# Patient Record
(demographics unavailable — no encounter records)

---

## 2024-10-29 NOTE — HISTORY OF PRESENT ILLNESS
[FreeTextEntry1] : 51yoF who presents with chief complaint of painless rectal bleeding that occurs once a month or so for the last few years.  She has a family history of CRC in her mother and has undergone three colonoscopies since her 20s, most recently ~2 years ago (Dr. Rodney, hemorrhoids, diverticulosis noted, no specimens taken).  No fever, chills, drainage other than blood.

## 2024-10-29 NOTE — PROCEDURE
[FreeTextEntry1] : After informed consent was obtained, a lubricated lighted anoscope was inserted into the anal canal to evaluate hemorrhoids. The canal was inspected circumferentially up to the level above the dentate line.  The L lateral internal hemorrhoid was banded above the dentate line.  The scope was withdrawn. The patient tolerated the procedure well.

## 2024-10-29 NOTE — PLAN
[TextEntry] : -- Discussed with patient that she should begin a powder fiber supplement (Metamucil, Benefiber, psyllium, etc).  She was also advised to drink at least 8 glasses of water daily in addition. -- Miralax and stool softener may be added as needed -- Post rubber band ligation instructions given -- Suppository - start on Thursday -- Follow up in 3-4 weeks for repeat evaluation, possible repeat banding

## 2024-11-05 NOTE — HISTORY OF PRESENT ILLNESS
[FreeTextEntry1] : Patient is a 51-year-old female who presents for consultation.  Patient has complaints of generalized bodyaches and joint pain and low back pain.  Patient also is starting to have irregular periods.  Discussed perimenopause with patient.  Patient also states that she had bilateral breast implants and is unable to get breast MRI due to anxiety.  Discussed this with patient advised consult with breast surgeon.  Patient's last mammogram was July 2024.  Also advised patient to have rheumatology consultation.  Patient is also concerned that she has a gynecologic issue causing the low back pain advised patient have a pelvic sonogram.  Patient with a family history of a mother with colon cancer and a maternal grandmother with breast cancer.  Patient advised to have genetic testing.  Risk benefits and alternatives reviewed and questions answered patient is agreeable.  30 minutes of face time

## 2024-11-05 NOTE — DISCUSSION/SUMMARY
[FreeTextEntry1] : Impression: Perimenopause, body aches and joint pains, low back pain, family history of breast cancer and colon cancer  Recommendations and discussion as noted above dictated consultation

## 2024-11-06 NOTE — HISTORY OF PRESENT ILLNESS
[FreeTextEntry1] : 51-year-old female history of GERD, lumbar disc arthritis and herniation, anxiety ,  DeQuervain's , MELVIN    Patient reports referred by gynecologist for joint pain She reports joint pain in neck, lower back, shoulders, knees, upper arms, hips, feet, hands, wrist  Feels numbness in hands- thinks its due to carpel tunnel Joint pain going on  for several months, worse over last one month No difficultly of ROM Feels subjective swelling in hands and ankles. Chronic fatigue, not compliant with sleep apnea machine.  Colonoscopy 2 years ago, has rectal bleeding - reports she has hemorrhoids  Undergoing perimenopause  Myalgias  Numbness/tingling,  in right hand (first three fingers)  Patient denies joint erythema/warmth, morning stiffness, , fever, chills, weight loss, nasopharyngeal ulcers, chest pain, abdominal pain, cough, SOB, nausea, vomiting, diarrhea, constipation, dysuria, hematuria, rash, photosensitivity, Raynaud's, alopecia, dry eyes, dry mouth,  headaches, eye pain/redness, vision changes, myalgias, muscle weakness, dysphagia, jaw claudication miscarriages, Hx of DVT/PEs.  PMHx: As above PSHx: breast augmentation, 2 C sections, umbilical hernia, Dequervaines left wrist release, 5th metatarsal surgery for fracture  Family Hx: Denies family history of rheumatologic conditions including RA, SLE, Sjogren's, Myositis, scleroderma, or vasculitis Social Hx:  Smoking Hx: denies EtOH Hx: denies Drug use: denies Occupation:   , two girls

## 2024-11-06 NOTE — PHYSICAL EXAM
[TextEntry] :   GENERAL: Appears in no acute distress HEENT: EOMI. No conjunctival erythema. Moist mucous membranes. No nasopharyngeal ulcers NECK: Supple, no cervical lymphadenopathy CARDIOVASCULAR: RRR. S1, S2 auscultated.  PULMONARY: Clear to auscultation b/l, no wheezes, rales, or crackles MSK: No active synovitis, swelling, erythema, or warmth. Some DIP joint fullness No joint tenderness to palpation. No Bouchards or Heberdens nodes. No deformities. Normal ROM of neck, back, b/l upper and lower extremities. SKIN: No lesions or rashes NEURO: No focal deficits. Motor strength 5/5 in major muscle groups of b/l UE and LE. Sensation to soft touch intact in major dermatomes of b/l UE and LE. PSYCH: AAOx3. Normal affect and thought process.

## 2024-11-06 NOTE — ASSESSMENT
[FreeTextEntry1] : 51-year-old female history of GERD, lumbar disc arthritis and herniation, anxiety ,  DeQuervain's , MELVIN    Patient reports referred by gynecologist for joint pain She reports joint pain in neck, lower back, shoulders, knees, upper arms, hips, feet, hands, wrist  Feels numbness in hands- thinks its due to carpel tunnel Joint pain going on  for several months, worse over last one month No difficultly of ROM Feels subjective swelling in hands and ankles. Chronic fatigue, not compliant with sleep apnea machine.  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

## 2024-11-19 NOTE — PLAN
[TextEntry] : -- Continue bowel regimen -- Advised to call and schedule evaluation if any further episodes of bleeding -- Follow up as needed

## 2024-11-19 NOTE — PHYSICAL EXAM
[JVD] : no jugular venous distention  [Respiratory Effort] : normal respiratory effort [Normal Rate and Rhythm] : normal rate and rhythm [No Edema] : No edema [No Rash or Lesion] : No rash or lesion [Alert] : alert [Oriented to Person] : oriented to person [Oriented to Place] : oriented to place [Oriented to Time] : oriented to time [Calm] : calm [de-identified] : Soft, nondistended [de-identified] : External exam - L anterior and R anterior small skin tags.  LETI without masses/tenderness.  Anoscopy with circumferential grade I internal hemorrhoids, non-inflamed and nonbleeding.   [de-identified] : NAD [de-identified] : Normocephalic [de-identified] : Moves all extremities

## 2024-11-19 NOTE — HISTORY OF PRESENT ILLNESS
[FreeTextEntry1] : 11/19/2024: Presents for follow-up.  Denies any further episodes of painless bleeding other than one episode 4-5 days post-banding (expected).  None since.  She ultimately did not receive the suppositories due to insurance coverage issues.  10/29/2024: 51yoF who presents with chief complaint of painless rectal bleeding that occurs once a month or so for the last few years.  She has a family history of CRC in her mother and has undergone three colonoscopies since her 20s, most recently ~2 years ago (Dr. Rodney, hemorrhoids, diverticulosis noted, no specimens taken).  No fever, chills, drainage other than blood.

## 2025-02-06 NOTE — ASSESSMENT
[FreeTextEntry1] : Ms. Gomez is a 52 year old woman with a family history of breast cancer. Her breast exam today is without suspicious findings. The imaging is reviewed with her. We reviewed that her risk assessment is based on many factors. While her breast density last year was heterogenously dense leading to a lifetime risk of breast cancer being just over 20% at 21%, it was scattered fibroglandular density the past 3 consecutive years which would result in a lifetime risk for breast cancer less than 20%.   With a lifetime risk over 20%, management options would range from breast MRI to risk-reducing medication to, for those at very high risk from a genetic mutation or strong family history, prophylactic mastectomies. Ms. Gomez is very claustrophobic, even with xanax; we reviewed the use of contrast enhanced mammography as an alternative to breast MRI, and she would be amenable to that.   A mammogram and US are ordered for July. If the breast density is of scattered fibroglandular density, no further imaging is needed. If the breast density is heterogenously dense, heightened screening can be performed via a contrast enhanced mammogram.   She understands and is comfortable with the plan. She is encouraged to call if any questions or concerns arise.

## 2025-02-06 NOTE — HISTORY OF PRESENT ILLNESS
[FreeTextEntry1] : Ms. Gomez is a 52 year old woman who presents for a consultation for a family history of breast cancer. She is asymptomatic. No palpable breast or axillary lumps, nipple discharge, or breast skin changes.   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 Her family history is significant for breast cancer in her maternal grandmother 80's.

## 2025-02-06 NOTE — CONSULT LETTER
[Dear  ___] : Dear  [unfilled], [Consult Letter:] : I had the pleasure of evaluating your patient, [unfilled]. [Please see my note below.] : Please see my note below. [Consult Closing:] : Thank you very much for allowing me to participate in the care of this patient.  If you have any questions, please do not hesitate to contact me. [Sincerely,] : Sincerely, [FreeTextEntry3] : Emilie Shin MD FACS

## 2025-02-06 NOTE — DATA REVIEWED
[FreeTextEntry1] : I have independently reviewed the reports and the images.   B/l mammogram and US 7/29/24 - heterogenously dense [for this year; was scattered fibroglandular density in 2021, 2022, 2023] - BIRADS 2

## 2025-04-07 NOTE — HISTORY OF PRESENT ILLNESS
[FreeTextEntry1] : Patient is a 52-year-old female who presents for a routine annual gynecologic evaluation.  Patient states that she recently took antibiotics and has having some vaginal irritation and itching.  Will prescribe Lotrisone cream and Terazol 3 suppositories.  Patient with significant family history of breast and colon cancer.  Patient has a APC gene variant which puts the patient at increased risk of colon cancer.  Patient states she has had a colonoscopy about 3 years ago.  Patient also had treatment by colorectal surgeon for hemorrhoids.  Patient states she does have periodic rectal bleeding.  Discussed this with patient advised patient have consultation with gastroenterologist at her earliest convenience in view of her history and complaints.  Patient has a history of bilateral breast implants and patient is followed by breast surgeon.  Patient's last mammogram was July 2024 and last bone density was July 2023.

## 2025-04-07 NOTE — DISCUSSION/SUMMARY
[FreeTextEntry1] : Impression: Vulvovaginal itching, bilateral breast implants, APC gene mutation, family history of breast and colon cancer, otherwise normal GYN exam  Rx: Lotrisone cream and Terazol 3 suppositories-use as directed  Recommendations: Self breast exam, mammography and breast sonogram annually, continue follow-up with breast surgeon, gastroenterology consultation, calcium and vitamin D supplementation regular exercise  Follow-up in 6 months due to family history

## 2025-04-07 NOTE — PHYSICAL EXAM
[Appropriately responsive] : appropriately responsive [Alert] : alert [No Acute Distress] : no acute distress [No Lymphadenopathy] : no lymphadenopathy [Regular Rate Rhythm] : regular rate rhythm [No Murmurs] : no murmurs [Clear to Auscultation B/L] : clear to auscultation bilaterally [Soft] : soft [Non-tender] : non-tender [Non-distended] : non-distended [No HSM] : No HSM [No Lesions] : no lesions [No Mass] : no mass [Oriented x3] : oriented x3 [FreeTextEntry6] : Bilateral breast implants, no masses palpable there are no skin changes or nipple discharge axilla negative there is no lymphadenopathy [Examination Of The Breasts] : a normal appearance [] : implants [No Masses] : no breast masses were palpable [Labia Majora] : normal [Labia Minora] : normal [Normal] : normal [Uterine Adnexae] : normal [No Tenderness] : no tenderness [Nl Sphincter Tone] : normal sphincter tone [FreeTextEntry4] : Culture done [FreeTextEntry5] : Pap done [FreeTextEntry9] : Hemoccult negative

## 2025-07-03 NOTE — REASON FOR VISIT
[Consultation] : a consultation visit [FreeTextEntry1] : history of rectal bleeding and family history of colon cancer

## 2025-07-03 NOTE — ASSESSMENT
[FreeTextEntry1] : 51 yo female with history of rectal bleeding.  This most likely represents hemorrhoidal bleeding, however given genetic testing, would proceed with colonoscopy and upper endoscopy.  Local care for hemorrhoids. Risks, benefits, and alternatives to procedure explained to patient.  Patient is willing to proceed.

## 2025-07-22 NOTE — REASON FOR VISIT
[Home] : at home, [unfilled] , at the time of the visit. [Medical Office: ([facility])___] : at the medical office located in  [Telehealth (audio & video)] : This visit was provided via telehealth using real-time 2-way audio visual technology. [Verbal consent obtained from patient] : the patient, [unfilled] [Follow-up] : a follow-up of an existing diagnosis

## 2025-07-22 NOTE — HISTORY OF PRESENT ILLNESS
[FreeTextEntry1] : Patient is a 53 yo F here for follow-up prior to EGD and colonoscopy. Patient has a hx of newly dx APC gene variant and was recent seen in the office by Dr. Rodney on 7/3 and set up for these procedures due to new hx of genetic variant and intermittent BRBPR. She was anxious regarding the procedures and going to [facility] and with a different GI provider and wanted to have further discussions regarding the procedures.

## 2025-07-22 NOTE — ASSESSMENT
[FreeTextEntry1] : Patient is a 53 yo F here for follow-up prior to EGD and colonoscopy. Patient has a hx of newly dx APC gene variant and was recent seen in the office by Dr. Rodney on 7/3 and set up for these procedures due to new hx of genetic variant and intermittent BRBPR.   #Intermittent BRBPR #APC gene variant - Already set up for ECO as per Dr. Rodney - An extensive amount of time was spent explaining procedures again along with risks and benefits of these procedures - I advised patient that she should have genetic counseling completed to assess what additional screening tests need to be completed due to APC variant  Follow-up after procedures as previously recommended